# Patient Record
Sex: FEMALE | Race: ASIAN | NOT HISPANIC OR LATINO | URBAN - METROPOLITAN AREA
[De-identification: names, ages, dates, MRNs, and addresses within clinical notes are randomized per-mention and may not be internally consistent; named-entity substitution may affect disease eponyms.]

---

## 2018-10-21 ENCOUNTER — EMERGENCY (EMERGENCY)
Facility: HOSPITAL | Age: 23
LOS: 1 days | Discharge: HOME | End: 2018-10-21
Attending: EMERGENCY MEDICINE | Admitting: EMERGENCY MEDICINE
Payer: COMMERCIAL

## 2018-10-21 VITALS
RESPIRATION RATE: 17 BRPM | WEIGHT: 119.93 LBS | OXYGEN SATURATION: 100 % | HEIGHT: 67 IN | TEMPERATURE: 98 F | DIASTOLIC BLOOD PRESSURE: 79 MMHG | SYSTOLIC BLOOD PRESSURE: 111 MMHG | HEART RATE: 76 BPM

## 2018-10-21 VITALS
DIASTOLIC BLOOD PRESSURE: 81 MMHG | OXYGEN SATURATION: 98 % | HEART RATE: 67 BPM | SYSTOLIC BLOOD PRESSURE: 112 MMHG | RESPIRATION RATE: 16 BRPM | TEMPERATURE: 99 F

## 2018-10-21 DIAGNOSIS — Z91.013 ALLERGY TO SEAFOOD: ICD-10-CM

## 2018-10-21 DIAGNOSIS — R10.32 LEFT LOWER QUADRANT PAIN: ICD-10-CM

## 2018-10-21 LAB
ALBUMIN SERPL ELPH-MCNC: 4.7 G/DL — SIGNIFICANT CHANGE UP (ref 3.4–5)
ALP SERPL-CCNC: 73 U/L — SIGNIFICANT CHANGE UP (ref 40–120)
ALT FLD-CCNC: 28 U/L — SIGNIFICANT CHANGE UP (ref 12–42)
ANION GAP SERPL CALC-SCNC: 9 MMOL/L — SIGNIFICANT CHANGE UP (ref 9–16)
AST SERPL-CCNC: 29 U/L — SIGNIFICANT CHANGE UP (ref 15–37)
BASOPHILS NFR BLD AUTO: 0.7 % — SIGNIFICANT CHANGE UP (ref 0–2)
BILIRUB SERPL-MCNC: 0.9 MG/DL — SIGNIFICANT CHANGE UP (ref 0.2–1.2)
BUN SERPL-MCNC: 11 MG/DL — SIGNIFICANT CHANGE UP (ref 7–23)
CALCIUM SERPL-MCNC: 10 MG/DL — SIGNIFICANT CHANGE UP (ref 8.5–10.5)
CHLORIDE SERPL-SCNC: 100 MMOL/L — SIGNIFICANT CHANGE UP (ref 96–108)
CO2 SERPL-SCNC: 28 MMOL/L — SIGNIFICANT CHANGE UP (ref 22–31)
CREAT SERPL-MCNC: 0.68 MG/DL — SIGNIFICANT CHANGE UP (ref 0.5–1.3)
EOSINOPHIL NFR BLD AUTO: 5.2 % — SIGNIFICANT CHANGE UP (ref 0–6)
GLUCOSE SERPL-MCNC: 102 MG/DL — HIGH (ref 70–99)
HCT VFR BLD CALC: 43.3 % — SIGNIFICANT CHANGE UP (ref 34.5–45)
HGB BLD-MCNC: 14.1 G/DL — SIGNIFICANT CHANGE UP (ref 11.5–15.5)
IMM GRANULOCYTES NFR BLD AUTO: 0.2 % — SIGNIFICANT CHANGE UP (ref 0–1.5)
LIDOCAIN IGE QN: 160 U/L — SIGNIFICANT CHANGE UP (ref 73–393)
LYMPHOCYTES # BLD AUTO: 56 % — HIGH (ref 13–44)
MCHC RBC-ENTMCNC: 29 PG — SIGNIFICANT CHANGE UP (ref 27–34)
MCHC RBC-ENTMCNC: 32.6 G/DL — SIGNIFICANT CHANGE UP (ref 32–36)
MCV RBC AUTO: 89.1 FL — SIGNIFICANT CHANGE UP (ref 80–100)
MONOCYTES NFR BLD AUTO: 4.2 % — SIGNIFICANT CHANGE UP (ref 2–14)
NEUTROPHILS NFR BLD AUTO: 33.7 % — LOW (ref 43–77)
PLATELET # BLD AUTO: 305 K/UL — SIGNIFICANT CHANGE UP (ref 150–400)
POTASSIUM SERPL-MCNC: 3.6 MMOL/L — SIGNIFICANT CHANGE UP (ref 3.5–5.3)
POTASSIUM SERPL-SCNC: 3.6 MMOL/L — SIGNIFICANT CHANGE UP (ref 3.5–5.3)
PROT SERPL-MCNC: 8.8 G/DL — HIGH (ref 6.4–8.2)
RBC # BLD: 4.86 M/UL — SIGNIFICANT CHANGE UP (ref 3.8–5.2)
RBC # FLD: 12.2 % — SIGNIFICANT CHANGE UP (ref 10.3–14.5)
SODIUM SERPL-SCNC: 137 MMOL/L — SIGNIFICANT CHANGE UP (ref 132–145)
WBC # BLD: 5.8 K/UL — SIGNIFICANT CHANGE UP (ref 3.8–10.5)
WBC # FLD AUTO: 5.8 K/UL — SIGNIFICANT CHANGE UP (ref 3.8–10.5)

## 2018-10-21 PROCEDURE — 76856 US EXAM PELVIC COMPLETE: CPT | Mod: 26

## 2018-10-21 PROCEDURE — 99284 EMERGENCY DEPT VISIT MOD MDM: CPT

## 2018-10-21 PROCEDURE — 76830 TRANSVAGINAL US NON-OB: CPT | Mod: 26

## 2018-10-21 RX ORDER — KETOROLAC TROMETHAMINE 30 MG/ML
30 SYRINGE (ML) INJECTION ONCE
Qty: 0 | Refills: 0 | Status: DISCONTINUED | OUTPATIENT
Start: 2018-10-21 | End: 2018-10-21

## 2018-10-21 RX ORDER — IOHEXOL 300 MG/ML
30 INJECTION, SOLUTION INTRAVENOUS ONCE
Qty: 0 | Refills: 0 | Status: COMPLETED | OUTPATIENT
Start: 2018-10-21 | End: 2018-10-21

## 2018-10-21 RX ORDER — SODIUM CHLORIDE 9 MG/ML
1000 INJECTION INTRAMUSCULAR; INTRAVENOUS; SUBCUTANEOUS ONCE
Qty: 0 | Refills: 0 | Status: COMPLETED | OUTPATIENT
Start: 2018-10-21 | End: 2018-10-21

## 2018-10-21 RX ADMIN — SODIUM CHLORIDE 1000 MILLILITER(S): 9 INJECTION INTRAMUSCULAR; INTRAVENOUS; SUBCUTANEOUS at 19:30

## 2018-10-21 RX ADMIN — SODIUM CHLORIDE 1000 MILLILITER(S): 9 INJECTION INTRAMUSCULAR; INTRAVENOUS; SUBCUTANEOUS at 18:30

## 2018-10-21 RX ADMIN — IOHEXOL 30 MILLILITER(S): 300 INJECTION, SOLUTION INTRAVENOUS at 18:36

## 2018-10-21 NOTE — ED ADULT TRIAGE NOTE - CHIEF COMPLAINT QUOTE
Pt presents to ED with c/o LLQ abdominal pain after having brunch and EtOH yesterday. LMP x 2 weeks ago, denies N/V/D - sent here from UC for further evaluation

## 2018-10-21 NOTE — ED ADULT NURSE NOTE - OBJECTIVE STATEMENT
pt is a 24 y/o female ambulated into ED from  for further evaluation of LLQ pain. pt admits to drinking yesterday. denies N/V/D, fever/chills, urianry sx. pt is tearful during assessment, does not want IV/blood/meds done at this moment. she would like to take a moment to chill and decide whether she needs this due to her pain not being as severe. pt is a 24 y/o female ambulated into ED from  for further evaluation of LLQ pain. pt admits to drinking yesterday. denies N/V/D, fever/chills, urianry sx. pt is tearful during assessment, does not want IV/blood/meds done at this moment. she would like to take a moment to chill and decide whether she needs this due to her pain not being as severe. MD Henderson made aware of situation.

## 2018-10-21 NOTE — ED PROVIDER NOTE - OBJECTIVE STATEMENT
23 y.o F presents to the ED with c/o  sharp LLQ pain and colic since yesterday afternoon. Pt states it began while having brunch and consuming a couple of EtOH drinks. Initially attributed drinking to sx but the pain did not subside. No associated sx, no urinary problems, N/V/D. Pt went to Urgent Care for further evaluation after negative UA and pregnancy test. Reports hx of "stomach troubles" and denies hx of ovarian problems. No vaginal discharge. 23 y.o F presents to the ED with c/o  sharp LLQ pain and colic since yesterday afternoon. Pt states it began while having brunch and consuming a couple of EtOH drinks. Initially attributed drinking to sx but the pain did not subside. No associated sx, no urinary problems, N/V/D. Pt sent from Urgent Care for further evaluation after negative UA and pregnancy test. Reports hx of "stomach troubles" and denies hx of ovarian problems. No vaginal discharge.

## 2018-10-21 NOTE — ED ADULT NURSE NOTE - NSIMPLEMENTINTERV_GEN_ALL_ED
Implemented All Universal Safety Interventions:  [place] to call system. Call bell, personal items and telephone within reach. Instruct patient to call for assistance. Room bathroom lighting operational. Non-slip footwear when patient is off stretcher. Physically safe environment: no spills, clutter or unnecessary equipment. Stretcher in lowest position, wheels locked, appropriate side rails in place.

## 2018-10-21 NOTE — ED PROVIDER NOTE - CONSTITUTIONAL, MLM
normal... Somewhat tearful. Well appearing, well nourished, awake, alert, oriented to person, place, time/situation and in no apparent distress.

## 2018-10-21 NOTE — ED PROVIDER NOTE - MEDICAL DECISION MAKING DETAILS
Pt presenting with LUQ pain and LUQ pain in setting of recent ETOH use. Will check labs including lipase and Pelvic US. CTAP if other studies are found negative.

## 2018-10-21 NOTE — ED ADULT NURSE NOTE - CHPI ED NUR SYMPTOMS NEG
no diarrhea/no abdominal distension/no hematuria/no burning urination/no dysuria/no fever/no nausea/no blood in stool/no chills/no vomiting

## 2018-10-21 NOTE — ED PROVIDER NOTE - RADIATION
Steam inhalation with Vicks VapoRub as advised 3 or 4 times a day.  Bacitracin in both nostrils after steam inhalation.  Tylenol as needed for pain.  Make a follow-up appointment with your primary care physician if not better in 2-3 days.  Make a follow-up appointment with Dr. Lora as needed.  Return to emergency department if more problems.   no radiation

## 2018-10-21 NOTE — ED PROVIDER NOTE - PROGRESS NOTE DETAILS
Pt now declined labs, IV and workup, unsure if she wants to be further evaluation. Treatment rationally and extensively explained by MD and RN. Wet rad fo sono- neg. Patient agreeable to getting labs now. Wet read of sono- neg. Patient agreeable to getting labs now. Pain resolved, sono wnl, labs wnl. Will d/c. Canceling CTAP. Counselled on sx to return for.

## 2021-11-28 NOTE — ED ADULT NURSE NOTE - EXTENSIONS OF SELF_ADULT
"Presents complaining of recurrence of headache, and generalized mid abdominal pain persisting for approximately a month.  She describes experiencing episodic N/V/D since this AM.  Chief Complaint   Patient presents with   • Headache   • Abdominal Pain   • Nausea/Vomiting/Diarrhea     /90   Pulse 88   Temp 36.7 °C (98 °F) (Temporal)   Resp 20   Ht 1.575 m (5' 2\")   Wt 73 kg (160 lb 15 oz)   LMP  (LMP Unknown)   SpO2 96%   BMI 29.44 kg/m²      " None

## 2022-04-28 NOTE — ED PROVIDER NOTE - CARDIAC, MLM
Normal rate, regular rhythm.  Heart sounds S1, S2.  No murmurs, rubs or gallops.
[employer] dose 1 and 2
